# Patient Record
(demographics unavailable — no encounter records)

---

## 2017-01-05 NOTE — EMERGENCY DEPARTMENT REPORT
Upper Extremity





- [facility]


Chief Complaint: Extremity Injury, Upper


Stated Complaint: LEFT SIDE NUMBNESS/ PAINFUL TO SWALLOW


Time Seen by Provider: 17 08:11





ED Review of Systems


ROS: 


Stated complaint: LEFT SIDE NUMBNESS/ PAINFUL TO SWALLOW


Other details as noted in HPI








ED Past Medical Hx





- Past Medical History


Hx Hypertension: No


Hx Heart Attack/AMI: No


Hx Diabetes: No


Hx Liver Disease: No


Hx Renal Disease: No


Hx Seizures: No


Hx Asthma: No


Hx COPD: No


Hx Tuberculosis: No


Additional medical history: history of ectopic pregnancy





- Surgical History


Hx Cholecystectomy: Yes


Hx Appendectomy: Yes


Additional Surgical History: salpingectomy.  .  tummy tuck.  





- Social History


Smoking Status: Never Smoker


Substance Use Type: None





- Medications


Home Medications: 


 Home Medications











 Medication  Instructions  Recorded  Confirmed  Last Taken  Type


 


Ciprofloxacin HCl [Cipro] 500 mg PO Q12H #28 tab 14  Unknown Rx


 


HYDROcodone/APAP 5-325 [[place] 1 each PO Q6HR PRN #14 tablet 14  Unknown Rx





5-325 mg TAB]     


 


Prednisone [Prednisone 10 mg 10 mg PO .TAPER #1 tab.ds.pk 14  Unknown Rx





(6-Day Pack, 21 Tabs)]     


 


metroNIDAZOLE [Flagyl TAB] 500 mg PO Q8HR #42 tablet 14  Unknown Rx














Upper Extremity Exam





- Exam


General: 


Vital signs noted. No distress. Alert and acting appropriately.








ED Course


 Vital Signs











  17





  07:20


 


Temperature 98.3 F


 


Pulse Rate 83


 


Blood Pressure 138/98











Critical care attestation.: 


If time is entered above; I have spent that time in minutes in the direct care 

of this critically ill patient, excluding procedure time.








ED Disposition


Condition: Stable

## 2017-01-05 NOTE — EMERGENCY DEPARTMENT REPORT
HPI





- General


Chief Complaint: Extremity Injury, Upper


Time Seen by Provider: 17 08:11





- HPI


HPI: 


Patient is a 47-year-old female who presents to ED complaining of left sided 

neck and arm pain and tingling 2 days.  Patient states 2 days ago she started 

experiencing pain on her left neck area and subxiphoid tingling on her left 

arm.  Patient states pain is intermittent and is a throbbing type pain.  

Patient also complains of midsternal, nonradiating, 6 out of 10 intensity, 

stuck like feeling chest pain 1 day.


Patient denies fevers/chills/nausea/vomiting/chest pain/shortness of breath/

abdominal pain.








ED Past Medical Hx





- Past Medical History


Hx Hypertension: No


Hx Heart Attack/AMI: No


Hx Diabetes: No


Hx Liver Disease: No


Hx Renal Disease: No


Hx Seizures: No


Hx Asthma: No


Hx COPD: No


Hx Tuberculosis: No


Additional medical history: history of ectopic pregnancy





- Surgical History


Hx Cholecystectomy: Yes


Hx Appendectomy: Yes


Additional Surgical History: salpingectomy.  .  tummy tuck.  





- Social History


Smoking Status: Never Smoker


Substance Use Type: None





- Medications


Home Medications: 


 Home Medications











 Medication  Instructions  Recorded  Confirmed  Last Taken  Type


 


Ciprofloxacin HCl [Cipro] 500 mg PO Q12H #28 tab 14  Unknown Rx


 


HYDROcodone/APAP 5-325 [[place] 1 each PO Q6HR PRN #14 tablet 14  Unknown Rx





5-325 mg TAB]     


 


Prednisone [Prednisone 10 mg 10 mg PO .TAPER #1 tab.ds.pk 14  Unknown Rx





(6-Day Pack, 21 Tabs)]     


 


metroNIDAZOLE [Flagyl TAB] 500 mg PO Q8HR #42 tablet 14  Unknown Rx


 


Cyclobenzaprine [Flexeril] 10 mg PO TID PRN #20 tablet 17  Unknown Rx


 


Ibuprofen [Motrin] 800 mg PO Q8HR PRN #30 tablet 17  Unknown Rx














ED Review of Systems


ROS: 


Stated complaint: LEFT SIDE NUMBNESS/ PAINFUL TO SWALLOW


Other details as noted in HPI





Constitutional: denies: chills, fever


Eyes: denies: eye pain, eye discharge, vision change


ENT: denies: ear pain, throat pain, dental pain, hearing loss, congestion


Respiratory: denies: cough, shortness of breath, wheezing


Cardiovascular: denies: chest pain, palpitations


Endocrine: no symptoms reported


Gastrointestinal: denies: abdominal pain, nausea, vomiting, diarrhea


Genitourinary: denies: urgency, dysuria, discharge


Musculoskeletal: myalgia.  denies: back pain, joint swelling, arthralgia


Skin: denies: rash, lesions


Neurological: denies: headache, weakness, paresthesias, confusion, abnormal gait


Psychiatric: denies: anxiety, depression


Hematological/Lymphatic: denies: easy bleeding, easy bruising





Physical Exam





- Physical Exam


Vital Signs: 


 Vital Signs











  17





  07:20


 


Temperature 98.3 F


 


Pulse Rate 83


 


Blood Pressure 138/98











General: 


Alert and oriented 3.  Vital signs stable.  Patient in no acute distress





Physical Exam: 


GENERAL: Alert and oriented x3, no apparent distress, Normal Gait, atraumatic.


HEAD: Head is normocephalic and a-traumatic.


EYES: Extra ocular muscles are intact. Pupils are equal, round, and reactive to 

light and accommodation.


EARS: symetrical, atraumatic, non tender, ear canal clear and moderate cerumen, 

tympanic membrance non inflamed. gross auditory nml bilaterally. 


NOSE: Nose symetrical, Nontender,Nares appeared normal.


MOUTH:Mouth is well hydrated and without lesions. Tonsils nonerythematous or 

swollen,  Uvula midline, Tongue not elevated. Mucous membranes are moist. 

Posterior pharynx clear, no exudate or lesions. Patent airways.


NECK: Supple. Non edematous, No carotid bruits.  No lymphadenopathy or 

thyromegaly. Full ROM, No c-spine tenderness


LUNGS: Symetrical with respiration, No wheezing, no rales or crackles, CTAB.


HEART:  S1, S2 present, regular rate and rhythm without murmur, no rubs, no 

gallops. Tenderness to palpation of mid sternum. 


ABDOMEN: No organomegaly was noted,Positive bowel sounds, soft, and non-

distended.  . Nontender to palpation on all Quadrants, NO CVA tenderness.


EXTREMITIES/MUSCULOSKELETAL: No cyanosis, clubbing, rash, lesions or edema. 

Full ROM bilaterally. UE/LE Pulses 2+ bilaterally.  LE and UE 5+ strength 

bilaterally. Firm grasp bilaterally


NEUROLOGIC:  No focal Deficit, Cranial nerves II through XII are grossly 

intact. No loss of sensation,  No facial droop, Sensory nerves intact,


PSYCHIATRIC: Mood is congruent with affect, denies suicidal or homicidal 

ideations.


SKIN:  Warm and dry, No lesions, No ulceration or induration present.














ED Course


 Vital Signs











  17





  07:20


 


Temperature 98.3 F


 


Pulse Rate 83


 


Blood Pressure 138/98














ED Medical Decision Making





- Lab Data


Result diagrams: 


 17 10:16





 17 10:16





- Medical Decision Making


Patient 47-year-old female presents with neck pain.  Vital signs stable patient 

in no acute distress


ED course: Patient received 60 mg IM of Toradol.  Patient reports taking no 

medication or no allergies to any medication.


Discussed with patient follow-up with primary care physician as referred.  

Patient states she does not have a primary care physician.  Point-of-care blood 

glucose, normal at 97.


Chest x-ray ordered chest x-ray report shows normal study no cardiomegaly no 

acute injury. 


 EKG ordered.  EKG report shows normal sinus reading low voltage QRS otherwise 

normal. 


 Cardiac enzymes normal, discussed with Dr. Fox


Discussed his new symptoms to return to ED.  Discussed referral is given today 

and to follow up.  Patient states verbally that she will follow-up.


Discussed home medications ibuprofen and Flexeril.








Critical care attestation.: 


If time is entered above; I have spent that time in minutes in the direct care 

of this critically ill patient, excluding procedure time.








ED Disposition


Clinical Impression: 


 Neck ache, Cervical radicular pain, Nonspecific chest pain, Costochondral 

chest pain


Disposition: DISCHARGED TO HOME OR SELFCARE


Is pt being admited?: No


Does the pt Need Aspirin: No


Condition: Stable


Instructions:  Cervical Radiculopathy (ED), Musculoskeletal Pain (ED), Chest 

Pain (ED), Costochondritis (ED)


Prescriptions: 


Cyclobenzaprine [Flexeril] 10 mg PO TID PRN #20 tablet


 PRN Reason: Muscle Spasm


Ibuprofen [Motrin] 800 mg PO Q8HR PRN #30 tablet


 PRN Reason: Pain


Referrals: 


PRIMARY CARE,MD [Primary Care Provider] - 3-5 Days


[facility] [Outside] - 3-5 Days


[facility] [Outside] - 3-5 Days


[facility] [Outside] - 3-5 Days


[facility] [Outside] - 3-5 Days


[facility] [Outside] - 3-5 Days


Forms:  Work/School Release Form(ED)


Time of Disposition: 11:03


Print Language: Hungarian

## 2017-01-05 NOTE — XRAY REPORT
ROUTINE CHEST, TWO VIEWS:

PA and lateral views demonstrate the heart and mediastinal

contour to be of normal size and shape.  The lungs are clear and fully 

expanded and the soft tissues and bony structures are normal. No 

significant change compared to April 2012.



IMPRESSION:

Normal study.